# Patient Record
(demographics unavailable — no encounter records)

---

## 2025-03-12 NOTE — ADDENDUM
[FreeTextEntry1] : Entered by Kodi Anaya, acting as scribe for Dr. Jared Manzo. The documentation recorded by the scribe accurately reflects the service I personally performed and the decisions made by me.

## 2025-03-12 NOTE — LETTER BODY
[FreeTextEntry1] : Patient did not list a PCP or referring doctor.    Reason for Visit: BPH. Elevated PSA. Erectile dysfunction. Erectile dysfunction.    This is a 65 year old gentleman with symptoms of BPH, erectile dysfunction, and elevated PSA. The patient obtained a CT scan that demonstrated 2 mm right renal stone and an enlarged prostate, 107 cc. Patient obtained prostate MRI in November 2022 that demonstrated PI-RADS 2 and markedly enlarged prostate, prostate volume 109 cc. Patient is here today for follow up. Since he was last seen, Patient reports taking Flomax BID and Proscar regularly without any difficulties or side effects. Patient reports stable symptoms on medical therapy. However, he has erectile dysfunction. Patient reports normal libido and sex drive. However, he notes decreased penile tumescence. Patient does not smoke. He denies any hematuria or urinary incontinence. Patient also reports erectile dysfunction. Patient reports normal libido and sex drive. However he notes decreased penile tumescence. Patient does not smoke. Patient had previous left [facility] 4 years ago in [place]. He has not seen a family doctor in 4 years. He reports no pain. All other review of systems are negative. Past medical history, family history, and social history were inquired and were noncontributory to patient current condition. Medications and allergies were reviewed.    On examination, the patient is a healthy-appearing gentleman in no acute distress. He is alert and oriented follows commands. He has normal mood and affect. He is normocephalic. Neck is supple. Oral no thrush Respirations are unlabored. His abdomen is soft and nontender. Bladder is nonpalpable. No CVA tenderness. Neurologically he is grossly intact. No peripheral edema. Skin without gross abnormality. He has normal male external genitalia. Normal meatus. Bilateral testes are descended intrascrotally and normal to palpation. The prostate is markedly enlarged, 109 cc.  His BMP demonstrated normal renal functions, creatinine 0.88. His PSA was 3.06, which is within normal limits.   ASSESSMENT: BPH. Elevated PSA. Erectile dysfunction.    I counseled the patient. In terms of his BPH, the patient reports stable symptoms on medical therapy. I renewed the patient's prescription for Flomax and Proscar today. I encouraged the patient to continue medications regularly as directed. In terms of his elevated PSA, his PSA is stable at 3.06. In terms of his erectile dysfunction, I discussed the various etiologies of erectile dysfunction. I recommended he try Viagra. I discussed the potential side effects of the medication. I counseled the patient on its use and side effects. If the patient develops any side effects, the patient will discontinue the medication and contact me. Risks and alternatives were discussed. I answered the patient questions. The patient will follow-up as directed and will contact me with any questions or concerns.  Patient will continue longitudinal care for his complex and serious chronic condition.  Plan: Trial of Viagra. Continue Flomax BID and Proscar. Follow up as directed.